# Patient Record
Sex: MALE | Race: WHITE
[De-identification: names, ages, dates, MRNs, and addresses within clinical notes are randomized per-mention and may not be internally consistent; named-entity substitution may affect disease eponyms.]

---
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
## 2021-05-26 ENCOUNTER — HOSPITAL ENCOUNTER (EMERGENCY)
Dept: HOSPITAL 57 - BURERS | Age: 21
Discharge: HOME | End: 2021-05-26
Payer: COMMERCIAL
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
## 2021-05-26 DIAGNOSIS — F17.290: ICD-10-CM

## 2021-05-26 DIAGNOSIS — F17.210: ICD-10-CM

## 2021-05-26 DIAGNOSIS — S29.011A: Primary | ICD-10-CM

## 2021-05-26 PROCEDURE — 99283 EMERGENCY DEPT VISIT LOW MDM: CPT
